# Patient Record
Sex: MALE | Race: WHITE | NOT HISPANIC OR LATINO | URBAN - NONMETROPOLITAN AREA
[De-identification: names, ages, dates, MRNs, and addresses within clinical notes are randomized per-mention and may not be internally consistent; named-entity substitution may affect disease eponyms.]

---

## 2017-07-27 PROBLEM — E11.9: Noted: 2017-07-27

## 2017-07-27 PROBLEM — H40.1131: Noted: 2017-07-27

## 2017-07-27 PROBLEM — Z96.1: Noted: 2017-07-27

## 2017-07-27 PROBLEM — H52.4: Noted: 2017-07-27

## 2017-07-27 PROBLEM — H16.223: Noted: 2017-07-27

## 2019-02-13 ENCOUNTER — IMPORTED ENCOUNTER (OUTPATIENT)
Dept: URBAN - NONMETROPOLITAN AREA CLINIC 1 | Facility: CLINIC | Age: 74
End: 2019-02-13

## 2019-02-13 PROCEDURE — 92020 GONIOSCOPY: CPT

## 2019-02-13 PROCEDURE — 92250 FUNDUS PHOTOGRAPHY W/I&R: CPT

## 2019-02-13 PROCEDURE — 92014 COMPRE OPH EXAM EST PT 1/>: CPT

## 2019-02-13 PROCEDURE — 76514 ECHO EXAM OF EYE THICKNESS: CPT

## 2019-02-13 NOTE — PATIENT DISCUSSION
POAG OU-  Discussed diagnosis in detail with patient -  Optos done today stable findings OU-  OCT done previously stable by [facility] OU.-  VF done previouslyOD: reliable with small inferior step. OS: reliable and within normal limits. -  Gonio done today grade IV with light pigment OU. -  PACHY done today  and -  IOP 13 OU-  Cup to Disc noted at OD 0.3 and OS 0.5-  Patent PIs noted OU. -  No drops needed at this time. Not been on any since iStent OU at time of cataract surgery. -  Cont to  monitor every 6 months. Type II DM dx 2008-  Discussed diagnosis in detail with patient -  Well controlled on oral medication and diet. -  No retinopathy noted in exam today. -  Discussed importance of good diet and BS control-  OCT done previously: stable no changes -  Continue to monitor closely for changes. Pseudophakia (TORIC) OU-  Discussed exam findings with pt-  Stable doing well. -  s/p Yag PC OU open capsules noted -  Will cont to monitor PRN BERE OU mild:  -  Explained BERE and associated symptoms.-  Patient currently using ATs OU QID PRN and Omega 3s.  -  Will continue to monitor PRN.; 's Notes: MR 12/13/13 - AOADFE  8/9/2018OCT 8/9/2018Optos  2/13/19VF  8/9/2018Gonio  2/13/19PACH 2/13/19

## 2019-08-29 ENCOUNTER — IMPORTED ENCOUNTER (OUTPATIENT)
Dept: URBAN - NONMETROPOLITAN AREA CLINIC 1 | Facility: CLINIC | Age: 74
End: 2019-08-29

## 2019-08-29 PROCEDURE — 92083 EXTENDED VISUAL FIELD XM: CPT

## 2019-08-29 PROCEDURE — 92014 COMPRE OPH EXAM EST PT 1/>: CPT

## 2019-08-29 PROCEDURE — 92133 CPTRZD OPH DX IMG PST SGM ON: CPT

## 2019-08-29 NOTE — PATIENT DISCUSSION
POAG OU-  Discussed diagnosis in detail with patient -  Optos done today stable findings OU-  OCT done today:  Good RNFL noted OU. -  VF done today stable:  OD: reliable with small inferior step. OS: reliable and within normal limits. -  Gonio done previously grade IV with light pigment OU. -  PACHY done previously:  and -  IOP 12 OU-  Cup to Disc noted at OD 0.3 and OS 0.5-  Patent PIs noted OU. -  No drops needed at this time. Not been on any since iStent OU at time of cataract surgery. -  Cont to  monitor every 6 months. Type II DM dx 2008-  Discussed diagnosis in detail with patient -  Well controlled on oral medication and diet. -  No retinopathy noted in exam today. -  Discussed importance of good diet and BS control-  OCT done previously: stable no changes -  Continue to monitor closely for changes. Pseudophakia (TORIC) OU-  Discussed exam findings with pt-  Stable doing well. -  s/p Yag PC OU open capsules noted -  Will cont to monitor PRN BERE OU mild:  -  Explained BERE and associated symptoms.-  Patient currently using ATs OU QID PRN and Omega 3s.  -  Will continue to monitor PRN.; 's Notes: MR 12/13/13 - AOADFE  8/9/2018OCT disc  8/29/19Optos  2/13/19VF  8/29/19Gonio  2/13/19PACH 2/13/19

## 2022-04-09 ASSESSMENT — VISUAL ACUITY
OS_CC: 20/30-
OD_CC: 20/20-
OS_CC: 20/20-
OD_CC: 20/25+

## 2022-04-09 ASSESSMENT — PACHYMETRY
OS_CT_UM: 531; ADJ: THIN
OS_CT_UM: 531; ADJ: THIN
OD_CT_UM: 526; ADJ: THIN
OD_CT_UM: 526; ADJ: THIN

## 2022-04-09 ASSESSMENT — TONOMETRY
OD_IOP_MMHG: 13
OS_IOP_MMHG: 13
OD_IOP_MMHG: 13
OS_IOP_MMHG: 13